# Patient Record
Sex: FEMALE | Race: ASIAN | NOT HISPANIC OR LATINO | URBAN - METROPOLITAN AREA
[De-identification: names, ages, dates, MRNs, and addresses within clinical notes are randomized per-mention and may not be internally consistent; named-entity substitution may affect disease eponyms.]

---

## 2017-05-08 ENCOUNTER — OFFICE VISIT - HEALTHEAST (OUTPATIENT)
Dept: SLEEP MEDICINE | Facility: CLINIC | Age: 57
End: 2017-05-08

## 2017-05-08 ENCOUNTER — AMBULATORY - HEALTHEAST (OUTPATIENT)
Dept: SLEEP MEDICINE | Facility: CLINIC | Age: 57
End: 2017-05-08

## 2017-05-08 DIAGNOSIS — G47.33 OSA ON CPAP: ICD-10-CM

## 2017-05-08 ASSESSMENT — MIFFLIN-ST. JEOR: SCORE: 1307.97

## 2017-05-15 ENCOUNTER — AMBULATORY - HEALTHEAST (OUTPATIENT)
Dept: SLEEP MEDICINE | Facility: CLINIC | Age: 57
End: 2017-05-15

## 2017-07-06 ENCOUNTER — RECORDS - HEALTHEAST (OUTPATIENT)
Dept: ADMINISTRATIVE | Facility: OTHER | Age: 57
End: 2017-07-06

## 2017-08-23 ENCOUNTER — HOSPITAL ENCOUNTER (OUTPATIENT)
Dept: MAMMOGRAPHY | Facility: CLINIC | Age: 57
Discharge: HOME | End: 2017-08-23

## 2017-08-23 DIAGNOSIS — Z12.31 VISIT FOR SCREENING MAMMOGRAM: ICD-10-CM

## 2018-05-07 ENCOUNTER — RECORDS - HEALTHEAST (OUTPATIENT)
Dept: ADMINISTRATIVE | Facility: OTHER | Age: 58
End: 2018-05-07

## 2018-07-26 ENCOUNTER — RECORDS - HEALTHEAST (OUTPATIENT)
Dept: ADMINISTRATIVE | Facility: OTHER | Age: 58
End: 2018-07-26

## 2018-07-30 ENCOUNTER — RECORDS - HEALTHEAST (OUTPATIENT)
Dept: LAB | Facility: HOSPITAL | Age: 58
End: 2018-07-30

## 2018-07-30 LAB
ALBUMIN SERPL-MCNC: 3.9 G/DL (ref 3.5–5)
ALT SERPL W P-5'-P-CCNC: 27 U/L (ref 0–45)
AST SERPL W P-5'-P-CCNC: 27 U/L (ref 0–40)
C REACTIVE PROTEIN LHE: 0.2 MG/DL (ref 0–0.8)
CREAT SERPL-MCNC: 0.8 MG/DL (ref 0.6–1.1)
ERYTHROCYTE [DISTWIDTH] IN BLOOD BY AUTOMATED COUNT: 11.9 % (ref 11–14.5)
ERYTHROCYTE [SEDIMENTATION RATE] IN BLOOD BY WESTERGREN METHOD: 16 MM/HR (ref 0–20)
GFR SERPL CREATININE-BSD FRML MDRD: >60 ML/MIN/1.73M2
HCT VFR BLD AUTO: 39.4 % (ref 35–47)
HGB BLD-MCNC: 13.1 G/DL (ref 12–16)
MCH RBC QN AUTO: 30 PG (ref 27–34)
MCHC RBC AUTO-ENTMCNC: 33.2 G/DL (ref 32–36)
MCV RBC AUTO: 90 FL (ref 80–100)
PLATELET # BLD AUTO: 162 THOU/UL (ref 140–440)
PMV BLD AUTO: 9.7 FL (ref 8.5–12.5)
RBC # BLD AUTO: 4.36 MILL/UL (ref 3.8–5.4)
RHEUMATOID FACT SERPL-ACNC: <15 IU/ML (ref 0–30)
TSH SERPL DL<=0.005 MIU/L-ACNC: 0.37 UIU/ML (ref 0.3–5)
WBC: 4.6 THOU/UL (ref 4–11)

## 2018-07-31 LAB
25(OH)D3 SERPL-MCNC: 23.5 NG/ML (ref 30–80)
ANA SER QL: 0.2 U
CCP AB SER IA-ACNC: <0.5 U/ML

## 2018-08-27 ENCOUNTER — HOSPITAL ENCOUNTER (OUTPATIENT)
Dept: MAMMOGRAPHY | Facility: CLINIC | Age: 58
Discharge: HOME | End: 2018-08-27

## 2018-08-27 DIAGNOSIS — Z12.31 VISIT FOR SCREENING MAMMOGRAM: ICD-10-CM

## 2018-09-04 ENCOUNTER — RECORDS - HEALTHEAST (OUTPATIENT)
Dept: LAB | Facility: CLINIC | Age: 58
End: 2018-09-04

## 2018-09-04 LAB
ALBUMIN SERPL-MCNC: 3.7 G/DL (ref 3.5–5)
ALP SERPL-CCNC: 117 U/L (ref 45–120)
ALT SERPL W P-5'-P-CCNC: 25 U/L (ref 0–45)
ANION GAP SERPL CALCULATED.3IONS-SCNC: 7 MMOL/L (ref 5–18)
AST SERPL W P-5'-P-CCNC: 19 U/L (ref 0–40)
BILIRUB SERPL-MCNC: 0.3 MG/DL (ref 0–1)
BUN SERPL-MCNC: 15 MG/DL (ref 8–22)
CALCIUM SERPL-MCNC: 9 MG/DL (ref 8.5–10.5)
CHLORIDE BLD-SCNC: 113 MMOL/L (ref 98–107)
CO2 SERPL-SCNC: 22 MMOL/L (ref 22–31)
CREAT SERPL-MCNC: 0.75 MG/DL (ref 0.6–1.1)
GFR SERPL CREATININE-BSD FRML MDRD: >60 ML/MIN/1.73M2
GLUCOSE BLD-MCNC: 92 MG/DL (ref 70–125)
POTASSIUM BLD-SCNC: 4 MMOL/L (ref 3.5–5)
PROT SERPL-MCNC: 6.5 G/DL (ref 6–8)
SODIUM SERPL-SCNC: 142 MMOL/L (ref 136–145)

## 2018-09-05 ENCOUNTER — RECORDS - HEALTHEAST (OUTPATIENT)
Dept: ADMINISTRATIVE | Facility: OTHER | Age: 58
End: 2018-09-05

## 2018-09-05 LAB — TSH SERPL DL<=0.005 MIU/L-ACNC: 0.55 UIU/ML (ref 0.3–5)

## 2018-09-06 ENCOUNTER — OFFICE VISIT - HEALTHEAST (OUTPATIENT)
Dept: CARDIOLOGY | Facility: CLINIC | Age: 58
End: 2018-09-06

## 2018-09-06 DIAGNOSIS — G47.33 OBSTRUCTIVE SLEEP APNEA: ICD-10-CM

## 2018-09-06 DIAGNOSIS — R53.83 FATIGUE, UNSPECIFIED TYPE: ICD-10-CM

## 2018-09-06 DIAGNOSIS — R00.1 BRADYCARDIA WITH 41-50 BEATS PER MINUTE: ICD-10-CM

## 2018-09-06 DIAGNOSIS — I10 ESSENTIAL HYPERTENSION: ICD-10-CM

## 2018-09-06 ASSESSMENT — MIFFLIN-ST. JEOR: SCORE: 1184.37

## 2018-09-07 ENCOUNTER — AMBULATORY - HEALTHEAST (OUTPATIENT)
Dept: CARDIOLOGY | Facility: CLINIC | Age: 58
End: 2018-09-07

## 2018-09-11 ENCOUNTER — HOSPITAL ENCOUNTER (OUTPATIENT)
Dept: CARDIOLOGY | Facility: CLINIC | Age: 58
Discharge: HOME | End: 2018-09-11
Attending: INTERNAL MEDICINE

## 2018-09-11 DIAGNOSIS — R00.1 BRADYCARDIA WITH 41-50 BEATS PER MINUTE: ICD-10-CM

## 2019-01-17 ENCOUNTER — RECORDS - HEALTHEAST (OUTPATIENT)
Dept: LAB | Facility: HOSPITAL | Age: 59
End: 2019-01-17

## 2019-01-17 LAB
ALBUMIN SERPL-MCNC: 3.4 G/DL (ref 3.5–5)
ALT SERPL W P-5'-P-CCNC: 13 U/L (ref 0–45)
AST SERPL W P-5'-P-CCNC: 19 U/L (ref 0–40)
C REACTIVE PROTEIN LHE: 0.4 MG/DL (ref 0–0.8)
CREAT SERPL-MCNC: 0.8 MG/DL (ref 0.6–1.1)
ERYTHROCYTE [DISTWIDTH] IN BLOOD BY AUTOMATED COUNT: 12.2 % (ref 11–14.5)
ERYTHROCYTE [SEDIMENTATION RATE] IN BLOOD BY WESTERGREN METHOD: 21 MM/HR (ref 0–20)
GFR SERPL CREATININE-BSD FRML MDRD: >60 ML/MIN/1.73M2
HCT VFR BLD AUTO: 38.9 % (ref 35–47)
HGB BLD-MCNC: 12.9 G/DL (ref 12–16)
MCH RBC QN AUTO: 29.6 PG (ref 27–34)
MCHC RBC AUTO-ENTMCNC: 33.2 G/DL (ref 32–36)
MCV RBC AUTO: 89 FL (ref 80–100)
PLATELET # BLD AUTO: 195 THOU/UL (ref 140–440)
PMV BLD AUTO: 8.7 FL (ref 8.5–12.5)
RBC # BLD AUTO: 4.36 MILL/UL (ref 3.8–5.4)
URATE SERPL-MCNC: 7.1 MG/DL (ref 2–7.5)
WBC: 6.6 THOU/UL (ref 4–11)

## 2019-01-18 ENCOUNTER — OFFICE VISIT - HEALTHEAST (OUTPATIENT)
Dept: SLEEP MEDICINE | Facility: CLINIC | Age: 59
End: 2019-01-18

## 2019-01-18 DIAGNOSIS — G47.33 OBSTRUCTIVE SLEEP APNEA: ICD-10-CM

## 2019-01-18 DIAGNOSIS — J30.89 SEASONAL ALLERGIC RHINITIS DUE TO OTHER ALLERGIC TRIGGER: ICD-10-CM

## 2019-01-18 DIAGNOSIS — G47.8 SLEEP DYSFUNCTION WITH SLEEP STAGE DISTURBANCE: ICD-10-CM

## 2019-01-18 ASSESSMENT — MIFFLIN-ST. JEOR: SCORE: 1179.84

## 2019-01-24 ENCOUNTER — RECORDS - HEALTHEAST (OUTPATIENT)
Dept: ADMINISTRATIVE | Facility: OTHER | Age: 59
End: 2019-01-24

## 2019-05-30 ENCOUNTER — AMBULATORY - HEALTHEAST (OUTPATIENT)
Dept: ADMINISTRATIVE | Facility: REHABILITATION | Age: 59
End: 2019-05-30

## 2019-05-30 DIAGNOSIS — M19.90 OSTEOARTHRITIS: ICD-10-CM

## 2019-05-30 DIAGNOSIS — M79.7 FIBROMYALGIA: ICD-10-CM

## 2021-05-24 ENCOUNTER — VIRTUAL VISIT (OUTPATIENT)
Dept: SLEEP MEDICINE | Facility: CLINIC | Age: 61
End: 2021-05-24
Payer: COMMERCIAL

## 2021-05-24 VITALS — WEIGHT: 170 LBS | HEIGHT: 60 IN | BODY MASS INDEX: 33.38 KG/M2

## 2021-05-24 DIAGNOSIS — G47.33 OSA (OBSTRUCTIVE SLEEP APNEA): Primary | ICD-10-CM

## 2021-05-24 PROBLEM — E89.0 OTHER POSTABLATIVE HYPOTHYROIDISM: Status: ACTIVE | Noted: 2021-05-24

## 2021-05-24 PROBLEM — I10 HYPERTENSION: Status: ACTIVE | Noted: 2021-05-24

## 2021-05-24 PROCEDURE — 99214 OFFICE O/P EST MOD 30 MIN: CPT | Mod: 95 | Performed by: NURSE PRACTITIONER

## 2021-05-24 ASSESSMENT — MIFFLIN-ST. JEOR: SCORE: 1262.61

## 2021-05-24 NOTE — PROGRESS NOTES
"Does patient have any form of state insurance? y    Do you have wifi? n  Do you have a smart phone? n  Can you download an naima on your phone comfortably with out assistance? n  Can you watch a Youtube video? n    Ines Weiss is a 61 year old female being evaluated via a billable telephone visit.     \"This telephone visit will be conducted via a call between you and your physician/provider. We have found that certain health care needs can be provided without the need for an in-person visit or physical exam.  This service lets us provide the care you need with a telephone conversation.  If a prescription is necessary we can send it directly to your pharmacy.  If lab work is needed we can place an order for that and you can then stop by our lab to have the test done at a later time.\"    Telephone visits are billed at different rates depending on your insurance coverage.  Please reach out to your insurance provider with any questions.    Patient has given verbal consent for  a Telephone visit? Yes    What telephone number would you like your provider to contact at at:  [phone]    How would you like to obtain your AVS? Mail a copy    Smitha Blount CMA        "

## 2021-05-24 NOTE — PROGRESS NOTES
"Does patient have any form of state insurance? y    Do you have wifi? n  Do you have a smart phone? n  Can you download an naima on your phone comfortably with out assistance? n  Can you watch a Youtube video? n     Ines Weiss is a 61 year old female being evaluated via a billable telephone visit.      \"This telephone visit will be conducted via a call between you and your physician/provider. We have found that certain health care needs can be provided without the need for an in-person visit or physical exam.  This service lets us provide the care you need with a telephone conversation.  If a prescription is necessary we can send it directly to your pharmacy.  If lab work is needed we can place an order for that and you can then stop by our lab to have the test done at a later time.\"     Telephone visits are billed at different rates depending on your insurance coverage.  Please reach out to your insurance provider with any questions.     Patient has given verbal consent for  a Telephone visit? Yes     What telephone number would you like your provider to contact at at:  [phone]     How would you like to obtain your AVS? Mail a copy     Smitha Blount CMA     Telephone Visit Details:     Telephone Visit Start Time: 11:39 AM     Telephone Visit End Time:12:07 PM      CPAP Follow-Up Visit:    *Due to language barrier, a Dash  was present during the history-taking and subsequent discussion with this patient.      Date on this visit: 5/24/2021    Ines Weiss is a pleasant 61-year-old female with a past medical history pertinent for hypertension, gout, dyslipidemia, obesity, and severe YO who presents via telephone visit through a Dash  for a follow-up visit today to review her CPAP use for YO. She is doing well on CPAP. She also reports having a recent COVID-19 vaccination with some side effects that have been disrupting her sleep recently.  She would like to obtain a prescription for a new " CPAP device as her current device is approximately 5 years old and also refill mask & supplies today.  In addition, [place] and would like a prescription for a portable CPAP that she is willing to pay out-of-pocket for.  She would like that prescription sent to her home.    Previous Study Results: [facility] Night PSG  Date: 3/01/2016.  Weight 184 pounds.  AHI: 50.7/hr. RDI 50.7/hr. O2 flor 79%.      PAP machine: Respironics. Pressure settings: 11 cm    The compliance data shows that the patient used the CPAP for 30/30 nights, 96.7% of nights for >4 hours.  The 90th% pressure is 11 cm.  The average time in large leak is 4 seconds.  The average nightly usage is 6 hours 32 minutes.  The average AHI is 1.3/hr. The patient is compliantly using and benefiting from CPAP therapy.    DME: [facility]     Interface:  Mask: nasal mask  Chin strap: No  Leak: No  Using Humidifier: Yes  Condensation in hose or mask: No     Difficulties with therapy:    [-] Snoring with CPAP: None  [-] Difficulty tolerating the pressure: None  [-] Epistaxis/dry nose: None  [-] Nasal congestion: None  [-] Dry mouth: None  [-] Mouth breathing: None   [-] Pain/skin breakdown: None     Improvements noted with CPAP:   [+] waking up more refreshed  [+] sleeping better with less arousals  [+] nocturia improved   [+] improved energy level during the day    Weight change since last visit - lost about 10 lbs over the last year    Bedtime: 9:00 -11:00 PM, some difficulty falling asleep due to stress/joint pain.  Wake time: 7:00 - 8:30 AM. Falls asleep in  minutes at times. Wakes 4-5 times per night for 5 minutes.  Naps: 7 times per week for 30 minutes without CPAP.       Past medical/surgical history, family history, social history, medications and allergies were reviewed.      Problem List:  There are no active problems to display for this patient.         Impression/Plan:  1. YO  (obstructive sleep apnea)  - Comprehensive DME  - Miscellaneous DME    This is a pleasant 61-year-old female with a past medical history pertinent for hypertension, gout, dyslipidemia, obesity, and severe YO who presents via telephone visit through a [facility]  for a follow-up visit today to review her CPAP use for YO. She is doing well on CPAP and her use/compliance is excellent.  Her residual AHI with treatment is 1.3/h indicating good control of her YO.  The patient appears to be due for a new CPAP device as it is approximately 5 years of age.  She would like a new prescription sent to [facility] for this.  In addition, she would also like a portable CPAP that is easier for transport as she is currently staying with family [place] a farm and is willing to pay out-of-pocket for this.  A prescription for both of these are noted in the chart, the latter has been sent to her home per her request.    We discussed the need for possible compliance follow-up with regard to prescribing of a new CPAP device today.  Should this be needed, she will follow-up in approximately 2 months after starting her new device to review the use/compliance downloaded data at that time.  Otherwise, she will follow up in approximately 1 year or sooner as needed.  This patient is compliantly using and benefiting from CPAP therapy.    35 minutes were spent on the date of the encounter doing chart review, history and exam, documentation and further activities as noted above.     INÉS Sanders CNP  Sleep Medicine    CC: No ref. provider found

## 2021-05-24 NOTE — PATIENT INSTRUCTIONS
Your BMI is Body mass index is 33.2 kg/m .  Weight management is a personal decision.  If you are interested in exploring weight loss strategies, the following discussion covers the approaches that may be successful. Body mass index (BMI) is one way to tell whether you are at a healthy weight, overweight, or obese. It measures your weight in relation to your height.  A BMI of 18.5 to 24.9 is in the healthy range. A person with a BMI of 25 to 29.9 is considered overweight, and someone with a BMI of 30 or greater is considered obese. More than two-thirds of American adults are considered overweight or obese.  Being overweight or obese increases the risk for further weight gain. Excess weight may lead to heart disease and diabetes.  Creating and following plans for healthy eating and physical activity may help you improve your health.  Weight control is part of healthy lifestyle and includes exercise, emotional health, and healthy eating habits. Careful eating habits lifelong are the mainstay of weight control. Though there are significant health benefits from weight loss, long-term weight loss with diet alone may be very difficult to achieve- studies show long-term success with dietary management in less than 10% of people. Attaining a healthy weight may be especially difficult to achieve in those with severe obesity. In some cases, medications, devices and surgical management might be considered.  What can you do?  If you are overweight or obese and are interested in methods for weight loss, you should discuss this with your provider.     Consider reducing daily calorie intake by 500 calories.     Keep a food journal.     Avoiding skipping meals, consider cutting portions instead.    Diet combined with exercise helps maintain muscle while optimizing fat loss. Strength training is particularly important for building and maintaining muscle mass. Exercise helps reduce stress, increase energy, and improves fitness.  Increasing exercise without diet control, however, may not burn enough calories to loose weight.       Start walking three days a week 10-20 minutes at a time    Work towards walking thirty minutes five days a week     Eventually, increase the speed of your walking for 1-2 minutes at time    In addition, we recommend that you review healthy lifestyles and methods for weight loss available through the National Institutes of Health patient information sites:  http://win.niddk.nih.gov/publications/index.htm    And look into health and wellness programs that may be available through your health insurance provider, employer, local community center, or socorro club.    Weight management plan: Patient was referred to their PCP to discuss a diet and exercise plan.

## 2021-05-28 NOTE — NURSING NOTE
Cpap orders and Clinic notes faxed to [facility].  Copy of cpap order and cpap dl has been mailed to pt's home per request.    RICHIE Mendez

## 2021-05-30 VITALS — HEIGHT: 60 IN | BODY MASS INDEX: 35.34 KG/M2 | WEIGHT: 180 LBS

## 2021-06-01 VITALS — BODY MASS INDEX: 28.51 KG/M2 | WEIGHT: 151 LBS | HEIGHT: 61 IN

## 2021-06-02 ENCOUNTER — RECORDS - HEALTHEAST (OUTPATIENT)
Dept: ADMINISTRATIVE | Facility: CLINIC | Age: 61
End: 2021-06-02

## 2021-06-02 VITALS — HEIGHT: 61 IN | WEIGHT: 150 LBS | BODY MASS INDEX: 28.32 KG/M2

## 2021-06-03 ENCOUNTER — RECORDS - HEALTHEAST (OUTPATIENT)
Dept: ADMINISTRATIVE | Facility: CLINIC | Age: 61
End: 2021-06-03

## 2021-06-04 ENCOUNTER — MEDICAL CORRESPONDENCE (OUTPATIENT)
Dept: HEALTH INFORMATION MANAGEMENT | Facility: CLINIC | Age: 61
End: 2021-06-04

## 2021-06-08 ENCOUNTER — CARE COORDINATION (OUTPATIENT)
Dept: SLEEP MEDICINE | Facility: CLINIC | Age: 61
End: 2021-06-08

## 2021-06-08 NOTE — PROGRESS NOTES
"Received faxed request [facility] Sadia Barajas phone:  [phone] to addend notes of 5/24/2021 to say \"patient is compliantly using, and  benefiting from cpap therapy\".  Notes were addended, printed and faxed back 6/8/2021 to Fax :  [phone], along with signed CMN Cpap supplies, and Epic order for cpap supplies.   "

## 2021-06-10 NOTE — PROGRESS NOTES
Order for Durable Medical Equipment was processed and equipment ordered.   DME provider: [facility]  Date Faxed: 5/8/17  Ordering Provider:   Equipment ordered: Supplies

## 2021-06-10 NOTE — PROGRESS NOTES
Dear Dr. Michele L Van Vranken, MD  [address] [place] [address],    Thank you for the opportunity to participate in the care of Ines Weiss.     She is a 57 y.o. y/o female patient who comes to the sleep medicine clinic for follow up.    She was diagnosed with severe YO (AHI 50.7)  and has been using CPAP of 11 cwp.    Her symptoms are improved since she started using CPAP. She is not snoring with her device. She denies any PAP intolerance. She is using the device every night and tolerates the pressure well.     Residual AHI: 2.0  Leak: 34 seconds  Compliance: 100%    Mask Tolerance: excellent  Skin irritation: no    The patient wanted to know for how long she has to use CPAP, I answered that question (life long)      Past Medical History:   Diagnosis Date     Chronic gout      HTN (hypertension)      Hyperlipidemia      Hypothyroid      Obesity      Snoring        Social History     Social History     Marital status:      Spouse name: N/A     Number of children: N/A     Years of education: N/A     Occupational History     Not on file.     Social History Main Topics     Smoking status: Never Smoker     Smokeless tobacco: Not on file     Alcohol use No     Drug use: Not on file     Sexual activity: Not on file     Other Topics Concern     Not on file     Social History Narrative       Review of Systems:  General: No weight gain, no weight loss  Eyes: No vision changes  ENT: No hearing changes  Cardio: No chest pain, no nocturnal dyspnea  Respiratory: No shortness of breath, no cough  Gastrointestinal: No diarrhea, no constipation  Genitourinary: No excessive urination  Tegumentary: No rashes  Neurological: No seizures, no loss of consciousness  Endo: No heat or cold intolerance.    Current Outpatient Prescriptions   Medication Sig Dispense Refill     amLODIPine (NORVASC) 10 MG tablet   4     atorvastatin (LIPITOR) 40 MG tablet   5     gabapentin (NEURONTIN) 100 MG capsule Take 1 capsule by mouth 3 (three)  times a day.       ibuprofen (ADVIL,MOTRIN) 800 MG tablet Take 1 tablet by mouth see administration instructions. Every 6-8 hours prn for pain or fever       lisinopril (PRINIVIL,ZESTRIL) 40 MG tablet   4     spironolactone (ALDACTONE) 25 MG tablet Take 25 mg by mouth daily.       SYNTHROID 75 mcg tablet   6     ULORIC 80 mg Tab   5     No current facility-administered medications for this visit.        No Known Allergies    Physical Exam:  /60  Pulse (!) 56  Ht 5' (1.524 m)  Wt 180 lb (81.6 kg)  SpO2 97%  BMI 35.15 kg/m2  BMI:Body mass index is 35.15 kg/(m^2).   GEN: NAD, obese     Labs/Studies:     I reviewed the efficacy and compliance report from his device.     Lab Results   Component Value Date    WBC 4.4 03/15/2016    HGB 13.4 03/15/2016    HCT 38.8 03/15/2016    MCV 89 03/15/2016     03/15/2016         Chemistry        Component Value Date/Time     11/05/2013 1456    K 3.7 11/05/2013 1456     11/05/2013 1456    CO2 31 11/05/2013 1456    BUN 18 11/05/2013 1456    CREATININE 0.74 03/15/2016 1546     (H) 11/05/2013 1456        Component Value Date/Time    CALCIUM 8.5 11/05/2013 1456    ALKPHOS 139 (H) 11/05/2013 1456    AST 31 11/05/2013 1456    ALT 22 03/15/2016 1546    BILITOT 0.36 11/05/2013 1456              Assessment and Plan:  In summary Ines Weiss is a 57 y.o. year old female who is here for follow up.    1. Obstructive Sleep Apnea  Compliance and residual AHI is excellent.  We had a conversation to talk about sleep apnea in general and the natural course of the disease.  I will renew her supplies.   We counseled the patient on the importannce of using her CPAP device every night and the risks of not treating sleep apnea.      Patient verbalized understanding of these issues, agrees with the plan and all questions were answered today. Patient was given an opportuntity to voice any other symptoms or concerns not listed above. Patient did not have any other symptoms or  concerns.      Patient told to return in 12 months. Patient instructed to stop at  to schedule appointment before leaving today.    MD NALINI Tate Board Certified in Internal Medicine and Sleep Medicine  [facility].

## 2021-06-10 NOTE — PROGRESS NOTES
Order for Durable Medical Equipment was processed and equipment ordered.   DME provider: [facility]  Date Faxed: 5/8/17  Ordering Provider: Dr. Maki  Equipment ordered: Cpap supplies

## 2021-06-17 NOTE — PATIENT INSTRUCTIONS - HE
"Patient Instructions by Evaristo Green DO at 1/18/2019 11:00 AM     Author: Evaristo Green DO Service: -- Author Type: Physician    Filed: 1/18/2019 11:50 AM Encounter Date: 1/18/2019 Status: Addendum    : Evaristo Green DO (Physician)    Related Notes: Original Note by Evaristo Green DO (Physician) filed at 1/18/2019 11:49 AM       Please call Allergy clinic to schedule for an appointment.    Equipment Instructions    Contact information for [employer] company:    [employer] Tel: [phone]      SLEEP HYGIENE    Sleep only as much as you need to feel rested and then get out of bed   Keep a regular sleep schedule   Avoid forcing sleep   Exercise regularly for at least 20 minutes, preferably 4 to 5 hours before bedtime   Avoid caffeinated beverages after lunch   Avoid alcohol near bedtime: no \"night cap\"   Avoid smoking, especially in the evening   Do not go to bed hungry   Adjust bedroom environment   Avoid prolonged use of light-emitting screens before bedtime    Deal with your worries before bedtime     Please bring your machine, mask, hose and power cord on the next clinical visit with me. Thank you.           "

## 2021-06-23 NOTE — PROGRESS NOTES
Dear Dr. Van Vranken, Sukhi BORJAS Md  [address] [place] [address]    Thank you for the opportunity to participate in the care of Ms. Ines Weiss.    She is a 58 y.o. female who comes to the clinic transfer of care for obstructive sleep apnea.  The patient was diagnosed with severe obstructive sleep apnea on 03/01/2016.  Further details please refer to her sleep study that was performed here in our sleep center.  She states that when she was initially started on CPAP therapy she did very well and felt much more rested upon awakening.  However at around last August, she started noticing that when she was using her CPAP machine she was started to sneeze.  This made CPAP usage extremely uncomfortable.  Therefore she has been off CPAP usage since that time.  The patient's review of system is otherwise unremarkable.  Patient currently is using [employer] as the durable medical equipment company.     Ideal Sleep-Wake Cycle(devoid of societal pressure):    Patient would try to initiate sleep at around 9-10PM with a sleep latency of 7-8 minutes. The patient would have 1 awakening. Final wake up time is around 7AM.    Past Medical History  Past Medical History:   Diagnosis Date     Arrhythmia     bradycardia     Chronic gout      Coronary artery disease 2013    mild by CT coronary angiogram     HTN (hypertension)      Hyperlipidemia      Hypothyroid      Obesity      Sleep apnea     wears CPAP     Snoring         Past Surgical History  No past surgical history on file.     Meds  Current Outpatient Medications   Medication Sig Dispense Refill     alendronate (FOSAMAX) 70 MG tablet   2     amLODIPine (NORVASC) 10 MG tablet   4     CALCIUM 600 600 mg calcium (1,500 mg) tablet   2     gabapentin (NEURONTIN) 100 MG capsule Take 1 capsule by mouth 3 (three) times a day.       lisinopril (PRINIVIL,ZESTRIL) 40 MG tablet   4     naproxen (NAPROSYN) 500 MG tablet   2     probenecid-colchicine 500-0.5 mg per tablet   4     SYNTHROID 75 mcg  tablet   6     traZODone (DESYREL) 50 MG tablet   1     ULORIC 80 mg Tab 40 mg.         5     VITAMIN D2 50,000 unit capsule   0     No current facility-administered medications for this visit.         Allergies  Patient has no known allergies.     Social History  Social History     Socioeconomic History     Marital status:      Spouse name: Not on file     Number of children: Not on file     Years of education: Not on file     Highest education level: Not on file   Social Needs     Financial resource strain: Not on file     Food insecurity - worry: Not on file     Food insecurity - inability: Not on file     Transportation needs - medical: Not on file     Transportation needs - non-medical: Not on file   Occupational History     Not on file   Tobacco Use     Smoking status: Never Smoker     Smokeless tobacco: Never Used   Substance and Sexual Activity     Alcohol use: No     Drug use: Not on file     Sexual activity: Not on file   Other Topics Concern     Not on file   Social History Narrative     Not on file        Family History  Family History   Problem Relation Age of Onset     Diabetes type II Mother      Coronary Stenting Brother 45        Review of Systems:  Constitutional: Negative except as noted in HPI.   Eyes: Negative except as noted in HPI.   ENT: Negative except as noted in HPI.   Cardiovascular: Negative except as noted in HPI.   Respiratory: Negative except as noted in HPI.   Gastrointestinal: Negative except as noted in HPI.   Genitourinary: Negative except as noted in HPI.   Musculoskeletal: Negative except as noted in HPI.   Integumentary: Negative except as noted in HPI.   Neurological: Negative except as noted in HPI.   Psychiatric: Negative except as noted in HPI.   Endocrine: Negative except as noted in HPI.   Hematologic/Lymphatic: Negative except as noted in HPI.      No flowsheet data found.  Epworths Sleepiness Scale 6/22/2016 5/8/2017 1/18/2019   Sitting and reading 2 1 2  "  Watching TV 3 3 2   Sitting, inactive in a public place (e.g. a theatre or a meeting) 3 3 2   As a passenger in a car for an hour without a break 3 3 2   Lying down to rest in the afternoon when circumstances permit 3 3 2   Sitting and talking to someone 3 1 0   Sitting quietly after a lunch without alcohol 3 3 0   In a car, while stopped for a few minutes in traffic 2 0 1   Total score 22 17 11     No flowsheet data found.    Physical Exam:  /62   Pulse (!) 50   Ht 5' 0.5\" (1.537 m)   Wt 150 lb (68 kg)   SpO2 99%   BMI 28.81 kg/m    BMI:Body mass index is 28.81 kg/m .   GEN: NAD, appropriate for age  Head: Normocephalic.  EYES: PERRLA, EOMI  ENT: Oropharynx is clear, mallampatti class 3+ airway. Uvula is intact  Nasal mucosa is mild erythema with enlarged turbinates.  Neck : Thyroid is within normal limits.  CV: Regular rate and rhythm, S1 & S2 positive.  LUNGS: Bilateral breathsounds heard.   ABDOMEN: Positive bowel sounds in all quadrants, soft, no rebound or guarding  MUSCULOSKELETAL: Bilateral trace leg swelling  SKIN: warm, dry, no rashes  Neurological: Alert, oriented to time, place, and person.  Psych: normal mood, normal affect     Labs/Studies:     Lab Results   Component Value Date    WBC 6.6 01/17/2019    HGB 12.9 01/17/2019    HCT 38.9 01/17/2019    MCV 89 01/17/2019     01/17/2019         Chemistry        Component Value Date/Time     09/04/2018 1555    K 4.0 09/04/2018 1555     (H) 09/04/2018 1555    CO2 22 09/04/2018 1555    BUN 15 09/04/2018 1555    CREATININE 0.80 01/17/2019 1419    GLU 92 09/04/2018 1555        Component Value Date/Time    CALCIUM 9.0 09/04/2018 1555    ALKPHOS 117 09/04/2018 1555    AST 19 01/17/2019 1419    ALT 13 01/17/2019 1419    BILITOT 0.3 09/04/2018 1555            No results found for: FERRITIN  Lab Results   Component Value Date    TSH 0.55 09/04/2018         Assessment and Plan:  In summary Ines Weiss is a 58 y.o. year old female here for " transfer of care.  1.  Obstructive sleep apnea  I agree with the patient to hold off on CPAP usage until her's sneezing is controlled. I will defer her to follow-up with a line a durable medical equipment company to address how to properly clean her machine.  2.  Possible allergic rhinitis  I will defer the patient to see an allergist to see if she is allergic to anything that may be causing her sneezing  3.  Other sleep disturbance    Patient verbalized understanding of these issues, agrees with the plan and all questions were answered today. Patient was given an opportuntity to voice any other symptoms or concerns not listed above. Patient did not have any other symptoms or concerns.     Return to clinic in 6 months and to bring her machine mask hose and power cord.     Evaristo Green DO  Board Certified in Internal Medicine and Sleep Medicine  [facility].    (Note created with Dragon voice recognition and unintended spelling errors and word substitutions may occur)     All information was obtained through the help of the .

## 2021-06-26 NOTE — PROGRESS NOTES
Progress Notes by Natasha Harvey MD at 9/6/2018  8:50 AM     Author: Natasha Harvey MD Service: -- Author Type: Physician    Filed: 9/6/2018 11:01 AM Encounter Date: 9/6/2018 Status: Signed    : Natasha Harvey MD (Physician)           Click to link to [facility]     [facility] HEART CARE NOTE    Thank you, Dr. Van Vranken, for asking the [facility] team to see Ms. Ines Weiss in the rapid access clinic to evaluate bradycardia and fatigue.    Assessment/Recommendations   Assessment:    1.  Bradycardia.  ECG demonstrates marked sinus bradycardia with a heart rate of 47. Her baseline ECG shows no evidence of significant conduction abnormality.  I suspect this is due to enhanced vagal tone as she has had a tendency for sinus bradycardia and EKGs dating back to 2013.  Doubt that this is the cause of her recent fatigue and would not be an indication for permanent pacemaker insertion.  I have recommended a Holter monitor to look for evidence of sinus pauses or high-grade AV block that could cause her symptoms and be an indication for a pacemaker.  She is agreeable and this will be arranged.   2.  Fatigue, likely multifactorial.  She does have a history of depression as well as significant stress as her son is undergoing treatment for cancer.  Again doubt this is directly related to her bradycardia she has had a tendency for bradycardia for many years now and reports the fatigue is more recent in the last 2 weeks.  3.  YO, treated with CPAP.  She tells me that her last visit with sleep medicine was good and that her mask was working appropriately.  She is due for repeat evaluation      Plan:  1.  Continue current medications  2.  Arrange outpatient Holter monitor with further recommendations to follow       History of Present Illness    Ms. Ines Weiss is a 58 y.o. female with history of essential hypertension and resting bradycardia who is referred to the rapid access clinic today for evaluation of  fatigue and marked sinus bradycardia and a recent ECG.  She was into her primary physician for evaluation of fatigue which have been present for 1-2 weeks.  She did go to the [facility] a week ago where she became lightheaded.  They were seen in the first aid station where she was mildly hypotensive but no other abnormality identified.  She was advised to drink fluid and subsequently had 2-3 bottles of water with resolution of symptoms.  Outside of that episode, she has had no recent symptoms of near syncope or true syncope.  She does not do much exercise because of knee pain.  No history of any exertional chest discomfort or significant dyspnea.  Because of some complaints of chest pain in the past, she did undergo CT coronary angiography several years ago demonstrating minimal disease.    ECG (personally reviewed): ECG demonstrates marked sinus bradycardia, rate 47.  No acute ischemic changes.    Cardiac Imaging Studies (personally reviewed): No recent imaging     Physical Examination Review of Systems   Vitals:    09/06/18 0911   BP: 110/64   Pulse: (!) 48   Resp: 16     Body mass index is 29 kg/(m^2).  Wt Readings from Last 3 Encounters:   09/06/18 151 lb (68.5 kg)   05/08/17 180 lb (81.6 kg)   06/22/16 183 lb (83 kg)     General Appearance:   Awake, Alert, No acute distress.   HEENT:  No scleral icterus; the mucous membranes were pink and moist.   Neck: No cervical bruits or jugular venous distention    Chest: The spine was straight. The chest was symmetric.   Lungs:   Respirations unlabored; the lungs are clear to auscultation. No wheezing   Cardiovascular:    Regular rhythm which is mildly bradycardic.  S1, S2 normal.  No murmur, gallop or rub   Abdomen:  No organomegaly, masses, bruits, or tenderness. Bowels sounds are present   Extremities:  No peripheral edema, clubbing or cyanosis.   Skin: No xanthelasma. Warm, Dry.   Musculoskeletal: No tenderness.   Neurologic: Mood and affect are appropriate.     General: Weight Loss  Eyes: WNL  Ears/Nose/Throat: WNL  Lungs: WNL  Heart: WNL  Stomach: WNL  Bladder: WNL  Muscle/Joints: WNL  Skin: WNL  Nervous System: WNL  Mental Health: WNL     Blood: WNL     Medical History  Surgical History Family History Social History   Past Medical History:   Diagnosis Date   ? Arrhythmia     bradycardia   ? Chronic gout    ? Coronary artery disease 2013    mild by CT coronary angiogram   ? HTN (hypertension)    ? Hyperlipidemia    ? Hypothyroid    ? Obesity    ? Sleep apnea     wears CPAP   ? Snoring     No past surgical history on file. Family History   Problem Relation Age of Onset   ? Diabetes type II Mother    ? Coronary Stenting Brother 45    Social History     Social History   ? Marital status:      Spouse name: N/A   ? Number of children: N/A   ? Years of education: N/A     Occupational History   ? Not on file.     Social History Main Topics   ? Smoking status: Never Smoker   ? Smokeless tobacco: Never Used   ? Alcohol use No   ? Drug use: Not on file   ? Sexual activity: Not on file     Other Topics Concern   ? Not on file     Social History Narrative          Medications  Allergies   Current Outpatient Prescriptions   Medication Sig Dispense Refill   ? amLODIPine (NORVASC) 10 MG tablet   4   ? atorvastatin (LIPITOR) 40 MG tablet   5   ? gabapentin (NEURONTIN) 100 MG capsule Take 1 capsule by mouth 3 (three) times a day.     ? levothyroxine (SYNTHROID, LEVOTHROID) 88 MCG tablet   2   ? lisinopril (PRINIVIL,ZESTRIL) 40 MG tablet   4   ? naproxen (NAPROSYN) 500 MG tablet   2   ? probenecid-colchicine 500-0.5 mg per tablet   4   ? traZODone (DESYREL) 50 MG tablet   1   ? VITAMIN D2 50,000 unit capsule   0   ? ibuprofen (ADVIL,MOTRIN) 800 MG tablet Take 1 tablet by mouth see administration instructions. Every 6-8 hours prn for pain or fever     ? spironolactone (ALDACTONE) 25 MG tablet Take 25 mg by mouth daily.     ? SYNTHROID 75 mcg tablet   6   ? ULORIC 80 mg Tab   5     No  current facility-administered medications for this visit.       No Known Allergies      Lab Results    Chemistry/lipid CBC Cardiac Enzymes/BNP/TSH/INR   Lab Results   Component Value Date    CHOL 266 (H) 07/19/2013    HDL 44 07/19/2013    LDLCALC 197 (H) 07/19/2013    TRIG 126 07/19/2013    CREATININE 0.75 09/04/2018    BUN 15 09/04/2018    K 4.0 09/04/2018     09/04/2018     (H) 09/04/2018    CO2 22 09/04/2018    Lab Results   Component Value Date    WBC 4.6 07/30/2018    HGB 13.1 07/30/2018    HCT 39.4 07/30/2018    MCV 90 07/30/2018     07/30/2018    Lab Results   Component Value Date    TROPONINI 0.02 04/23/2013     (H) 07/14/2011    TSH 0.55 09/04/2018

## 2021-07-21 ENCOUNTER — RECORDS - HEALTHEAST (OUTPATIENT)
Dept: ADMINISTRATIVE | Facility: CLINIC | Age: 61
End: 2021-07-21

## 2022-04-18 ENCOUNTER — VIRTUAL VISIT (OUTPATIENT)
Dept: SLEEP MEDICINE | Facility: CLINIC | Age: 62
End: 2022-04-18
Payer: MEDICARE

## 2022-04-18 VITALS — WEIGHT: 175 LBS | HEIGHT: 60 IN | BODY MASS INDEX: 34.36 KG/M2

## 2022-04-18 DIAGNOSIS — G47.33 OSA (OBSTRUCTIVE SLEEP APNEA): Primary | ICD-10-CM

## 2022-04-18 DIAGNOSIS — E66.811 OBESITY (BMI 30.0-34.9): ICD-10-CM

## 2022-04-18 PROCEDURE — 99214 OFFICE O/P EST MOD 30 MIN: CPT | Mod: 95 | Performed by: NURSE PRACTITIONER

## 2022-04-18 ASSESSMENT — SLEEP AND FATIGUE QUESTIONNAIRES
HOW LIKELY ARE YOU TO NOD OFF OR FALL ASLEEP WHILE SITTING QUIETLY AFTER LUNCH WITHOUT ALCOHOL: HIGH CHANCE OF DOZING
HOW LIKELY ARE YOU TO NOD OFF OR FALL ASLEEP WHILE SITTING AND READING: SLIGHT CHANCE OF DOZING
HOW LIKELY ARE YOU TO NOD OFF OR FALL ASLEEP WHEN YOU ARE A PASSENGER IN A CAR FOR AN HOUR WITHOUT A BREAK: MODERATE CHANCE OF DOZING
HOW LIKELY ARE YOU TO NOD OFF OR FALL ASLEEP WHILE LYING DOWN TO REST IN THE AFTERNOON WHEN CIRCUMSTANCES PERMIT: MODERATE CHANCE OF DOZING
HOW LIKELY ARE YOU TO NOD OFF OR FALL ASLEEP WHILE SITTING INACTIVE IN A PUBLIC PLACE: MODERATE CHANCE OF DOZING
HOW LIKELY ARE YOU TO NOD OFF OR FALL ASLEEP IN A CAR, WHILE STOPPED FOR A FEW MINUTES IN TRAFFIC: WOULD NEVER DOZE
HOW LIKELY ARE YOU TO NOD OFF OR FALL ASLEEP WHILE SITTING AND TALKING TO SOMEONE: SLIGHT CHANCE OF DOZING
HOW LIKELY ARE YOU TO NOD OFF OR FALL ASLEEP WHILE WATCHING TV: SLIGHT CHANCE OF DOZING

## 2022-04-18 NOTE — PROGRESS NOTES
"Ines Weiss is a 62 year old female being evaluated via a billable telephone visit.     \"This telephone visit will be conducted via a call between you and your physician/provider. We have found that certain health care needs can be provided without the need for an in-person visit or physical exam.  This service lets us provide the care you need with a telephone conversation.  If a prescription is necessary we can send it directly to your pharmacy.  If lab work is needed we can place an order for that and you can then stop by our lab to have the test done at a later time.\"    Telephone visits are billed at different rates depending on your insurance coverage.  Please reach out to your insurance provider with any questions.    Patient has given verbal consent for  a Telephone visit? Yes    What telephone number would you like your provider to contact at at:  [phone] (home)     How would you like to obtain your AVS? Mail a copy    Bozena Weaver Virtual Facilitator    Telephone Visit Details:     Telephone Visit Start Time: {telephone visit start/end time for provider to select:147315}    Telephone Visit End Time:{telephone visit start/end time for provider to select:268117}    "

## 2022-04-18 NOTE — PROGRESS NOTES
"Ines Weiss is a 62 year old female being evaluated via a billable telephone visit.     \"This telephone visit will be conducted via a call between you and your physician/provider. We have found that certain health care needs can be provided without the need for an in-person visit or physical exam.  This service lets us provide the care you need with a telephone conversation.  If a prescription is necessary we can send it directly to your pharmacy.  If lab work is needed we can place an order for that and you can then stop by our lab to have the test done at a later time.\"    Telephone visits are billed at different rates depending on your insurance coverage.  Please reach out to your insurance provider with any questions.    Patient has given verbal consent for  a Telephone visit? Yes    What telephone number would you like your provider to contact at at:  [phone] (home)     How would you like to obtain your AVS? Mail a copy    Bozena Weaver Virtual Facilitator    Telephone Visit Details:     Telephone Visit Start Time: 3:10 PM    Telephone Visit End Time:  3:35 PM        Obstructive Sleep Apnea - PAP Follow-Up Visit:    Chief Complaint   Patient presents with     Telephone     YO on CPAP follow up visit     **Due to language barrier, a En[employer]  was present during the history-taking and subsequent discussion with this patient.    Ines Weiss  is a pleasant 62-year-old female with a past medical history pertinent for hypertension, gout, dyslipidemia, obesity, and severe YO who presents via telephone visit through a En[employer]ises  for a follow-up visit today to review her CPAP use for YO. She reports infrequent use of her CPAP due to issues with the power cord getting hot with use. She did obtain a new portable CPAP device but it is quite loud and she is not using that device much. She has been using her old CPAP per download data but it still remains under recall from [employer]. She has " registered her device with [employer]. She stated that her DME told her that there is a shortage of these devices and it is unclear when should would be able to get a new device to replace. She would like a new order for this since it has been nearly a year since it was sent last time.    DME: [facility]    [employer]: [employer]  [facility]  Split night sleep study 3/2/16 (procedure tab)  Severe sleep apnea    Previous Study Results: [facility] - Split Night PSG  Date: 3/01/2016.  Weight 184 pounds.  AHI: 50.7/hr. RDI 50.7/hr. O2 flor 79%.    Overall, she rates the experience with PAP as 8 (0 poor, 10 great). The mask is comfortable. The mask is not leaking.  She is not snoring with the mask on. She is not having gasp arousals.  She is not having significant oral/nasal dryness. The pressure is comfortable.    Her PAP interface is Nasal Mask.    Bedtime is typically 9 - 11 PM. Usually it takes about  minutes to fall asleep with the mask on. Wake time is typically 7 - 8:30 AM.  Patient is using PAP therapy 6-7 hours per night. The patient is usually getting 6-8 hours of sleep per night.    She does feel rested in the morning.    [place] Sleepiness Scale: 12/24  (4/18/2022)    Insomnia Severity Index  (4/18/2022)    Difficulty falling asleep Severe    Difficulty staying asleep Severe    Problems waking up too early Very Severe    How SATISFIED/DISSATISFIED are you with your CURRENT sleep pattern? Dissatisfied    How NOTICEABLE to others do you think your sleep problem is in terms of impairing the quality of your life? Not at all noticeable    How WORRIED/DISTRESSED are you about your current sleep problem? Much    To what extent do you consider your sleep problem to INTERFERE with your daily functioning (e.g. daytime fatigue, mood, ability to function at work/daily chores, concentration, memory, mood, etc.) CURRENTLY? Much    Total Score 19         [employer]  CPAP 11 cmH2O 30 day usage data: 3/14/22 - 4/12/22  86.7% of days with > 4 hours of use. 1/30 days with no use.   Average use 6 hours 51 minutes per day.   Average time in large leak per day: 4 secs.    AHI 1.4 events per hour.       Past medical/surgical history, family history, social history, medications and allergies were reviewed.      Problem List:  Patient Active Problem List    Diagnosis Date Noted     Hypertension 05/24/2021     Priority: Medium     Formatting of this note might be different from the original.  Created by Conversion    Replacement Utility updated for latest IMO load       Other postablative hypothyroidism 05/24/2021     Priority: Medium     Formatting of this note might be different from the original.  Created by Conversion        Current Outpatient Medications   Medication     Levothyroxine Sodium (SYNTHROID PO)     LISINOPRIL PO     VITAMIN D PO     No current facility-administered medications for this visit.       Ht 1.524 m (5')   Wt 79.4 kg (175 lb)   BMI 34.18 kg/m      Impression/Plan:  1. YO (obstructive sleep apnea)  2. Obesity (BMI 30.0-34.9)  - Comprehensive DME    Severe Sleep apnea. Tolerating PAP well. Daytime symptoms are stable.  Her [place] score is 12 which is consistent with excessive daytime somnolence.  Her insomnia severity index score is 19 which is consistent with moderate severity clinical insomnia.  This may be due to intermittent use of her CPAP device and an irregular sleep schedule.  The patient notes that she is concerned about her CPAP device and that the power cord does get quite warm with use.  I have recommended the patient contact her DME supplier as they may be able to help replace this in the interim until she gets her new CPAP device.    A review of her CPAP download data shows good use and compliance and severe YO that is well controlled on current pressure settings.  I have encouraged continued use of her old CPAP  device until she gets a new one.  The patient does not clean her CPAP device with an ozone or UV light cleaning device but only soap and water as recommended.  Patient was notified that this does decrease her risk associated with using a recall CPAP device in this case.    A comprehensive DME order was placed for a new/replacement CPAP device maintaining her current pressure setting of 11 cm H2O, new mask and supplies sent to [facility] location, per patient request.    Ines Weiss will follow up in about 1 year(s), otherwise, in approximately 2 months after obtaining new APAP device should she require a CPAP compliance follow-up visit per Medicare guidelines.     30 minutes spent with patient, all of which were spent face-to-face counseling, consulting, chart review/documentation, and coordinating plan of care.      INÉS Sanders CNP  Sleep Medicine      CC:  Vanvranken, Michelle,     This note was written with the assistance of the Dragon voice-dictation technology software. The final document, although reviewed, may contain errors. For corrections, please contact the office.

## 2022-04-18 NOTE — PATIENT INSTRUCTIONS
"MY INFORMATION ON SLEEP APNEA-  Ines Weiss    DOCTOR : INÉS Sanders CNP  SLEEP CENTER :      MY CONTACT NUMBER:   [facility]  [phone]  [facility]   [phone]  [facility]   [phone]      [facility]  [phone]  [facility]   [phone]      [place]:  1. What is Obstructive Sleep Apnea (YO)? YO is the most common type of sleep apnea. Apnea literally means, \"without breath.\" It is characterized by repetitive pauses in breathing, despite continued effort to breathe, and is usually associated with a reduction in blood oxygen saturation. Apneas can last 10 to over 60 seconds. It is caused by narrowing or collapse of the upper airway as muscles relax during sleep.   2. What are the consequences of YO? Symptoms include: daytime sleepiness- possibly increasing the risk of falling asleep while driving, unrefreshing/restless sleep, snoring, insomnia, waking frequently to urinate, waking with heartburn or reflux, reduced concentration and memory, and morning headaches. Other health consequences may include development of high blood pressure. Untreated YO also can contribute to heart disease, stroke and diabetes.   3. What are the treatment options? In most situations, sleep apnea is a lifelong disease that must be managed with daily therapy. Continuous Positive Airway (CPAP) is the most reliable treatment. A mouthguard to hold your jaw forward is usually the next most reliable option. Other options include postioning devices (to keep you off your back), nasal valves, tongue retaining device, weight loss, surgery. There is more detail about these options toward the end of this document.  4. What are the most important things to remember about using CPAP?     WHERE CAN I FIND MORE INFORMATION?    American Academy of Sleep Medicine Patient information on sleep " disorders:  http://yoursleep.aasmnet.org    CPAP -  WHY AND HOW?                 Continuous positive airway pressure, or CPAP, is the most effective treatment for obstructive sleep apnea. It works by blowing room air, through a mask, to hold your throat open. A decision to use CPAP is a major step forward in the pursuit of a healthier life. The successful use of CPAP will help you breathe easier, sleep better and live healthier. Using CPAP can be a positive experience if you keep these encarnacion points in mind:  Commitment  CPAP is not a quick fix for your problem. It involves a long-term commitment to improve your sleep and your health.    Communication  Stay in close communication with both your sleep doctor and your CPAP supplier. Ask lots of questions and seek help when you need it.    Consistency  Use CPAP all night, every night and for every nap. You will receive the maximum health benefits from CPAP when you use it every time that you sleep. This will also make it easier for your body to adjust to the treatment.    Correction  The first machine and mask that you try may not be the best ones for you. Work with your sleep doctor and your CPAP supplier to make corrections to your equipment selection. Ask about trying a different type of machine or mask if you have ongoing problems. Make sure that your mask is a good fit and learn to use your equipment properly.    Challenge  Tell a family member or close friend to ask you each morning if you used your CPAP the previous night. Have someone to challenge you to give it your best effort.    Connection   Your adjustment to CPAP will be easier if you are able to connect with others who use the same treatment. Ask your sleep doctor if there is a support group in your area for people who have sleep apnea, or look for one on the Internet.  Comfort   Increase your level of comfort by using a saline spray, decongestant or heated humidifier if CPAP irritates your nose, mouth or  "throat. Use your unit's \"ramp\" setting to slowly get used to the air pressure level. There may be soft pads you can buy that will fit over your mask straps. Look on www.CPAP.com for accessories that can help make CPAP use more comfortable.  Cleaning   Clean your mask, tubing and headgear on a regular basis. Put this time in your schedule so that you don't forget to do it. Check and replace the filters for your CPAP unit and humidifier.    Completion   Although you are never finished with CPAP therapy, you should reward yourself by celebrating the completion of your first month of treatment. Expect this first month to be your hardest period of adjustment. It will involve some trial and error as you find the machine, mask and pressure settings that are right for you.    Continuation  After your first month of treatment, continue to make a daily commitment to use your CPAP all night, every night and for every nap.    CPAP-Tips to starting with success:  Begin using your CPAP for short periods of time during the day while you watch TV or read.    Use CPAP every night and for every nap. Using it less often reduces the health benefits and makes it harder for your body to get used to it.    Newer CPAP models are virtually silent; however, if you find the sound of your CPAP machine to be bothersome, place the unit under your bed to dampen the sound.     Make small adjustments to your mask, tubing, straps and headgear until you get the right fit. Tightening the mask may actually worsen the leak.  If it leaves significant marks on your face or irritates the bridge of your nose, it may not be the best mask for you.  Speak with the person who supplied the mask and consider trying other masks. Insurances will allow you to try different masks during the first month of starting CPAP.  Insurance also covers a new mask, hose and filter about every 6 months.    Use a saline nasal spray to ease mild nasal congestion. Neti-Pot or " saline nasal rinses may also help. Nasal gel sprays can help reduce nasal dryness.  Biotene mouthwash can be helpful to protect your teeth if you experience frequent dry mouth.  Dry mouth may be a sign of air escaping out of your mouth or out of the mask in the case of a full face mask.  Speak with your provider if you expect that is the case.     Take a nasal decongestant to relieve more severe nasal or sinus congestion.  Do not use Afrin (oxymetazoline) nasal spray more than 3 days in a row.  Speak with your sleep doctor if your nasal congestion is chronic.    Use a heated humidifier that fits your CPAP model to enhance your breathing comfort. Adjust the heat setting up if you get a dry nose or throat, down if you get condensation in the hose or mask.  Position the CPAP lower than you so that any condensation in the hose drains back into the machine rather than towards the mask.    Try a system that uses nasal pillows if traditional masks give you problems.    Clean your mask, tubing and headgear once a week. Make sure the equipment dries fully.    Regularly check and replace the filters for your CPAP unit and humidifier.    Work closely with your sleep provider and your CPAP supplier to make sure that you have the machine, mask and air pressure setting that works best for you. It is better to stop using it and call your provider to solve problems than to lay awake all night frustrated with the device.    Weight Loss:    Weight loss decreases severity of sleep apnea in most people with obesity. For those with mild obesity who have developed snoring with weight gain, even 15-30 pound weight loss can improve and occasionally eliminate sleep apnea.  Structured and life-long dietary and health habits are necessary to lose weight and keep healthier weight levels.     Though there are significant health benefits from weight loss, long-term weight loss is very difficult to achieve- studies show success with dietary  management in less than 10% of people. In addition, substantial weight loss may require years of dietary control and may be difficult if patients have severe obesity. In these cases, surgical management may be considered.    If you are interested in methods for weight loss, you should review the options discussed at the National Institutes of Health patient information sites:     http://win.niddk.nih.gov/publications/index.htm  http:/www.health.nih.gov/topic/WeightLossDieting    Bariatric programs offer counseling in all methods of weight loss:    Http:/www.[email or web address]/WeightLossSurgeryandMedicalMgmt/htm    Your BMI is Body mass index is 34.18 kg/m .    Weight management plan: Patient was referred to their PCP to discuss a diet and exercise plan.    Body mass index (BMI) is one way to tell whether you are at a healthy weight, overweight, or obese. It measures your weight in relation to your height.  A BMI of 18.5 to 24.9 is in the healthy range. A person with a BMI of 25 to 29.9 is considered overweight, and someone with a BMI of 30 or greater is considered obese.  Another way to find out if you are at risk for health problems caused by overweight and obesity is to measure your waist. If you are a woman and your waist is more than 35 inches, or if you are a man and your waist is more than 40 inches, your risk of disease may be higher.  More than two-thirds of American adults are considered overweight or obese. Being overweight or obese increases the risk for further weight gain.  Excess weight may lead to heart disease and diabetes. Creating and following plans for healthy eating and physical activity may help you improve your health.    Methods for maintaining or losing weight.    Weight control is part of healthy lifestyle and includes exercise, emotional health, and healthy eating habits.  Careful eating habits lifelong is the mainstay of weight control.  Though there are significant health  benefits from weight loss, long-term weight loss with diet alone may be very difficult to achieve- studies show long-term success with dietary management in less than 10% of people. Attaining a healthy weight may be especially difficult to achieve in those with severe obesity. In some cases, medications, devices and surgical management might be considered.    What can you do?    If you are overweight or obese and are interested in methods for weight loss, you should discuss this with your provider. In addition, we recommend that you review healthy life styles and methods for weight loss available through the National Institutes of Health patient information sites:     http://win.niddk.nih.gov/publications/index.htm

## 2022-05-02 NOTE — NURSING NOTE
Writer mailed out the AVS and a letter reminding patient to follow up with clinic 30-60 days after starting the CPAP machine. Writer faxed the orders and OV notes to [employer] [facility]. No further actions needed.

## 2022-11-29 ENCOUNTER — TELEPHONE (OUTPATIENT)
Dept: SLEEP MEDICINE | Facility: CLINIC | Age: 62
End: 2022-11-29

## 2022-11-29 NOTE — TELEPHONE ENCOUNTER
Orders from [facility] have been signed, faxed and scanned into chart.    RICHIE Mendez

## 2023-04-10 ENCOUNTER — TELEPHONE (OUTPATIENT)
Dept: SLEEP MEDICINE | Facility: CLINIC | Age: 63
End: 2023-04-10
Payer: MEDICARE

## 2023-04-10 ENCOUNTER — APPOINTMENT (OUTPATIENT)
Dept: INTERPRETER SERVICES | Facility: CLINIC | Age: 63
End: 2023-04-10
Payer: MEDICARE

## 2023-04-10 NOTE — TELEPHONE ENCOUNTER
Pt called and message left via  Services that compliance is needed for her cpap per [facility], pt must be seen between 5/7/23 and 7/5/23 in order to keep device.        RICHIE Mendez

## 2023-06-21 ENCOUNTER — OFFICE VISIT (OUTPATIENT)
Dept: SLEEP MEDICINE | Facility: CLINIC | Age: 63
End: 2023-06-21
Payer: MEDICARE

## 2023-06-21 VITALS
SYSTOLIC BLOOD PRESSURE: 105 MMHG | BODY MASS INDEX: 34.45 KG/M2 | DIASTOLIC BLOOD PRESSURE: 64 MMHG | HEART RATE: 55 BPM | WEIGHT: 176.4 LBS | OXYGEN SATURATION: 95 %

## 2023-06-21 DIAGNOSIS — G47.33 OSA (OBSTRUCTIVE SLEEP APNEA): Primary | ICD-10-CM

## 2023-06-21 PROCEDURE — 99214 OFFICE O/P EST MOD 30 MIN: CPT | Performed by: PHYSICIAN ASSISTANT

## 2023-06-21 RX ORDER — FEBUXOSTAT 40 MG/1
TABLET, FILM COATED ORAL
COMMUNITY
Start: 2023-02-02

## 2023-06-21 RX ORDER — ACETAMINOPHEN 500 MG
TABLET ORAL
COMMUNITY
Start: 2022-08-12

## 2023-06-21 RX ORDER — INDOMETHACIN 25 MG/1
CAPSULE ORAL
COMMUNITY
Start: 2022-08-12

## 2023-06-21 RX ORDER — ATOVAQUONE AND PROGUANIL HYDROCHLORIDE 250; 100 MG/1; MG/1
1 TABLET, FILM COATED ORAL DAILY
COMMUNITY
Start: 2023-02-09

## 2023-06-21 RX ORDER — CYCLOBENZAPRINE HCL 10 MG
TABLET ORAL
COMMUNITY
Start: 2022-08-12

## 2023-06-21 RX ORDER — ALENDRONATE SODIUM 70 MG/1
TABLET ORAL
COMMUNITY
Start: 2023-02-02

## 2023-06-21 RX ORDER — GABAPENTIN 300 MG/1
CAPSULE ORAL
COMMUNITY
Start: 2023-02-02

## 2023-06-21 RX ORDER — ERGOCALCIFEROL 1.25 MG/1
CAPSULE ORAL
COMMUNITY
Start: 2022-05-18

## 2023-06-21 RX ORDER — CELECOXIB 400 MG/1
CAPSULE ORAL
COMMUNITY
Start: 2022-08-12

## 2023-06-21 RX ORDER — TRAZODONE HYDROCHLORIDE 100 MG/1
TABLET ORAL
COMMUNITY
Start: 2023-02-02

## 2023-06-21 RX ORDER — ROSUVASTATIN CALCIUM 20 MG/1
TABLET, COATED ORAL
COMMUNITY
Start: 2023-02-02

## 2023-06-21 RX ORDER — PROBENECID AND COLCHICINE .5; 5 MG/1; MG/1
TABLET ORAL
COMMUNITY
Start: 2023-02-02

## 2023-06-21 RX ORDER — BUPROPION HYDROCHLORIDE 150 MG/1
TABLET ORAL
COMMUNITY
Start: 2023-02-02

## 2023-06-21 RX ORDER — TRAMADOL HYDROCHLORIDE 50 MG/1
TABLET ORAL
COMMUNITY
Start: 2023-02-02

## 2023-06-21 RX ORDER — AZITHROMYCIN 500 MG/1
500 TABLET, FILM COATED ORAL
COMMUNITY
Start: 2023-02-09

## 2023-06-21 NOTE — NURSING NOTE
Chief Complaint   Patient presents with     Sleep Problem     CPAP compliance       Initial /64   Pulse 55   Wt 80 kg (176 lb 6.4 oz)   SpO2 95%   BMI 34.45 kg/m   Estimated body mass index is 34.45 kg/m  as calculated from the following:    Height as of 4/18/22: 1.524 m (5').    Weight as of this encounter: 80 kg (176 lb 6.4 oz).    Medication Reconciliation: complete      DME: [employer]    ESS 12    ROXANNA 15    Mello Cummins CMA (AAMA)

## 2023-06-21 NOTE — PROGRESS NOTES
[facility]   Outpatient Sleep Medicine  Jun 21, 2023       Name: Ines Weiss MRN# [id number]   Age: 63 year old YOB: 1960            Assessment and Plan:   1. YO (obstructive sleep apnea)  Patient presents to clinic today for compliance visit on new/replacement ResMed 11 machine.  Patient has not used her machine yet, compliance period ends 7/5/2023.  Reason that patient has been unable to use her new machine is because [employer] was unable to give her the correct supplies for the new machine per patient.  She just got the new supplies and will start use tonight.  She has remained compliant on CPAP therapy, by use of her DreamStation 2 machine that she received from [employer].  We were unable to obtain formal download from the machine today but I pulled numbers from the machine itself that show 100% compliance over 4 hours the past 90 days.  Sleep apnea is well treated on current pressure of 12 cm H2O with low residual AHI of 3.5 events per hour.    Discussed with patient since she has not met compliance on new machine very likely that she will have to return it to [facility] [employer], but possibly they can extend compliance period given her proven compliance on her old machine? Patient would like to keep her ResMed 11 device because the water chamber is a lot easier for her to function compared to water chamber on the DreamStation 2.  Discussed if she isn't able to meet Medicare compliance rules we may have to update her sleep study in order to get coverage of the machine, she will call [employer] to discuss this and let me know if there is anything I can do for her or if I need to place orders for a new study or not.    She will follow-up in minimally 1 year for her annual visit but of course sooner pending the above.    - Comprehensive DME       Chief Complaint      Chief Complaint   Patient presents with     Sleep Problem     CPAP compliance          History of Present Illness:      Ines Weiss is a 63 year old female with hypertension, hypothyroidism, gout, dyslipidemia, obesity who presents to the clinic for follow-up of their severe obstructive sleep apnea treated with CPAP.     Declined  today.     Originally diagnosed with sleep apnea in 2013 and pursued treatment with mandibular advancement device, felt that the oral appliance was ineffective as she continued to snore and had poor quality of sleep.  Split-night PSG completed through [facility] 3/1/2016 (184#, BMI 36.8) showed evidence of severe sleep apnea with AHI 50.7, RDI 50.7, O2 flor 79%.  Sleep apnea was effectively treated with CPAP 11 cm H2O. Patient started on CPAP after the study and has been using ever since.    Uses [employer] for DME. Recently got replacement ResMed 11 CPAP device from [employer] and on my schedule today because needs to be seen between 5/7/23 and 7/5/23 for compliance in order to keep device.   Patient has not used her new ResMed 11 machine yet.  She has been unable to use her ResMed device because [employer] did not give her the correct supplies to use it.  She has continued to use her DreamStation 2 machine.  No official download today but data per machine over the last 90 days: 90/90 days use, 90 days >4 hours , average use 7 hours 42 minutes , average mask fit 100%, Average AHI 3.5    Patient is using a nasal pillow mask. The mask is comfortable. The mask is not leaking. They are not snoring with the mask on. They are not having gasp arousals.  They are not having significant oral/nasal dryness or epistaxis. The pressure setting is comfortable.     Patient's typical sleep schedule is variable, bedtime anywhere between 10-12:00 AM and wake time between 8-12:00 PM.    SCALES:   INSOMNIA: Insomnia Severity Score: 15   SLEEPINESS: Loami Sleepiness Score: 12    Past medical/surgical history, family history, social history, medications and allergies were reviewed.           Physical Examination:   /64    Pulse 55   Wt 80 kg (176 lb 6.4 oz)   SpO2 95%   BMI 34.45 kg/m    General appearance: Awake, alert, cooperative. Well groomed. Sitting comfortably in chair. In no apparent distress.  HEENT: Head: Normocephalic, atraumatic. Eyes:Conjunctiva clear. Sclera normal. Remainder of face covered by mask.   Pulmonary:  Able to speak easily in full sentences. No cough or wheeze.   Skin:  No rashes or significant lesions on visible skin.   Neurologic: Alert, oriented x3.   Psychiatric: Mood euthymic. Affect congruent with full range and intensity.      CC:  Vanvranken, Michelle Amber A. Baumann, PA-C  Jun 21, 2023     [facility] [place]  [address] [place] [address]     [facility] [place]  [address] [address]    Chart documentation was completed, in part, with [employer] voice-recognition software. Even though reviewed, some grammatical, spelling, and word errors may remain.    38 minutes spent on day of encounter doing chart review, history and exam, documentation, and further activities as noted above

## 2023-06-23 ENCOUNTER — DOCUMENTATION ONLY (OUTPATIENT)
Dept: SLEEP MEDICINE | Facility: CLINIC | Age: 63
End: 2023-06-23
Payer: COMMERCIAL